# Patient Record
Sex: FEMALE | Race: WHITE | NOT HISPANIC OR LATINO | URBAN - METROPOLITAN AREA
[De-identification: names, ages, dates, MRNs, and addresses within clinical notes are randomized per-mention and may not be internally consistent; named-entity substitution may affect disease eponyms.]

---

## 2021-11-20 ENCOUNTER — HOSPITAL ENCOUNTER (EMERGENCY)
Facility: MEDICAL CENTER | Age: 56
End: 2021-11-20
Payer: MEDICAID

## 2021-11-20 VITALS
BODY MASS INDEX: 31.78 KG/M2 | HEART RATE: 82 BPM | HEIGHT: 59 IN | RESPIRATION RATE: 16 BRPM | OXYGEN SATURATION: 97 % | DIASTOLIC BLOOD PRESSURE: 79 MMHG | TEMPERATURE: 96.8 F | SYSTOLIC BLOOD PRESSURE: 127 MMHG | WEIGHT: 157.63 LBS

## 2021-11-20 LAB — EKG IMPRESSION: NORMAL

## 2021-11-20 PROCEDURE — 302449 STATCHG TRIAGE ONLY (STATISTIC)

## 2021-11-20 PROCEDURE — 93005 ELECTROCARDIOGRAM TRACING: CPT

## 2021-11-20 NOTE — ED TRIAGE NOTES
"Chief Complaint   Patient presents with   • Shortness of Breath     states that she cannot take a deep breath       Patient states that she has been unable to take a deep breath for approx. A week.    Patient denies any COVID exposure and patient has been vaccinated for COVID as well as the Flu for this year.    Pt is alert and oriented, speaking in full sentences, follows commands and responds appropriately to questions. Resp are even and unlabored.      Pt placed in lobby. Pt educated on triage process. Pt encouraged to alert staff for any changes.     Patient and staff wearing appropriate PPE.    Protocol ordered and EKG completed in triage    /79   Pulse 82   Temp 36 °C (96.8 °F) (Temporal)   Resp 16   Ht 1.499 m (4' 11\")   Wt 71.5 kg (157 lb 10.1 oz)   SpO2 97%         "

## 2023-12-28 ENCOUNTER — OFFICE VISIT (OUTPATIENT)
Dept: RHEUMATOLOGY | Facility: MEDICAL CENTER | Age: 58
End: 2023-12-28
Attending: INTERNAL MEDICINE
Payer: MEDICAID

## 2023-12-28 VITALS
SYSTOLIC BLOOD PRESSURE: 130 MMHG | WEIGHT: 130.2 LBS | BODY MASS INDEX: 26.25 KG/M2 | HEART RATE: 107 BPM | OXYGEN SATURATION: 96 % | TEMPERATURE: 97.1 F | HEIGHT: 59 IN | DIASTOLIC BLOOD PRESSURE: 92 MMHG

## 2023-12-28 DIAGNOSIS — Z79.899 LONG-TERM USE OF PLAQUENIL: ICD-10-CM

## 2023-12-28 DIAGNOSIS — K75.4 AUTOIMMUNE HEPATITIS (HCC): ICD-10-CM

## 2023-12-28 DIAGNOSIS — M19.90 INFLAMMATORY ARTHRITIS: ICD-10-CM

## 2023-12-28 DIAGNOSIS — M25.50 POLYARTHRALGIA: ICD-10-CM

## 2023-12-28 DIAGNOSIS — Z79.1 NSAID LONG-TERM USE: ICD-10-CM

## 2023-12-28 PROCEDURE — 99212 OFFICE O/P EST SF 10 MIN: CPT | Performed by: INTERNAL MEDICINE

## 2023-12-28 PROCEDURE — 99205 OFFICE O/P NEW HI 60 MIN: CPT | Performed by: INTERNAL MEDICINE

## 2023-12-28 PROCEDURE — 3080F DIAST BP >= 90 MM HG: CPT | Performed by: INTERNAL MEDICINE

## 2023-12-28 PROCEDURE — 3075F SYST BP GE 130 - 139MM HG: CPT | Performed by: INTERNAL MEDICINE

## 2023-12-28 RX ORDER — LEVOTHYROXINE SODIUM 88 UG/1
TABLET ORAL
COMMUNITY

## 2023-12-28 RX ORDER — BUDESONIDE 3 MG/1
CAPSULE, COATED PELLETS ORAL
COMMUNITY

## 2023-12-28 RX ORDER — OMEPRAZOLE 20 MG/1
CAPSULE, DELAYED RELEASE ORAL
COMMUNITY

## 2023-12-28 RX ORDER — ATORVASTATIN CALCIUM 10 MG/1
10 TABLET, FILM COATED ORAL DAILY
COMMUNITY

## 2023-12-28 RX ORDER — AZATHIOPRINE 50 MG/1
TABLET ORAL
COMMUNITY

## 2023-12-28 RX ORDER — CELECOXIB 200 MG/1
CAPSULE ORAL
COMMUNITY
Start: 2023-11-06 | End: 2023-12-28

## 2023-12-28 RX ORDER — HYDROXYCHLOROQUINE SULFATE 200 MG/1
TABLET, FILM COATED ORAL
Qty: 135 TABLET | Refills: 1 | Status: SHIPPED | OUTPATIENT
Start: 2023-12-28

## 2023-12-28 RX ORDER — CELECOXIB 200 MG/1
CAPSULE ORAL
Qty: 180 CAPSULE | Refills: 1 | Status: SHIPPED | OUTPATIENT
Start: 2023-12-28

## 2023-12-28 ASSESSMENT — JOINT PAIN
TOTAL NUMBER OF TENDER JOINTS: 5
TOTAL NUMBER OF SWOLLEN JOINTS: 0

## 2023-12-28 NOTE — PROGRESS NOTES
Chief Complaint-joint pain    Chief Complaint   Patient presents with    New Patient     Rheumatoid arthritis, unspecified     Salina Salcedo is a 58 y.o. female here as a new Rheumatology Consult referred by KOFI Bass.    This is a new patient evaluation      HPI:   This is a new patient evaluation, patient referred for evaluation of inflammatory arthritis with erosions seen on hand x-rays.  Patient states that she has been having symptoms since about 2021 with pain in her hands, knees elbows, currently on Voltaren with some benefit.  Patient denies any family history of inflammatory arthritis.  Patient denies any history of psoriasis or any chronic skin conditions.    Getting factors is that patient does carry a diagnosis of autoimmune hepatitis and is currently on azathioprine for such through GI.    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      PMHx  HTN  DM  GERD  Hypothyroid  Autoimmune hepatitis  Retinal Pigmentosa bilateral eyes resulting in blindness    Fam Hx  M-passed DM  F-passed prostate cancer  Sx3 one passed liver cirrhosis, retinal pigmentosa  Bx3 liver cirrhosis, leukemia, IVDA, ETOH use    Soc Hx  No tobacco  ETOH every 2 months  No blood tx  No IVDA  No MJ use  No tattoos    Addendum 1/9/2024  G6PD 264 adequate 1/2024 [facility]  RF >650 1/2024 [facility]  Antismooth muscle ab 41 1/2024 [facility]  AMA neg 1/2024 [facility]  CCP>250 1/2024 [facility]    KEMAR neg 7/2023 [facility]      Hand x-rays-8/2023    Left Hand x-rays 8/2023          Current medicines (including changes today)  Current Outpatient Medications   Medication Sig Dispense Refill    atorvastatin (LIPITOR) 10 MG Tab Take 10 mg by mouth every day.      azaTHIOprine (IMURAN) 50 MG Tab 1 tablet, 1 time per day Oral 1 DAY for 0      budesonide (ENTOCORT EC) 3 MG Cap DR Particles capsule 3 capsules, 1 time per day Oral 1 DAY for 0 DYS      levothyroxine (SYNTHROID) 88 MCG Tab TAKE 1 TABLET BY MOUTH  "EVERY DAY IN THE MORNING ON AN EMPTY STOMACH Oral Once a day for 90 days      metformin (GLUCOPHAGE) 1000 MG tablet 1 tablet with a meal Oral twice a day for Diabetes for 90 days      omeprazole (PRILOSEC) 20 MG delayed-release capsule TAKE 1 CAPSULE BY MOUTH ONCE DAILY IN THE MORNING ON AN EMPTY STOMACH      hydroxychloroquine (PLAQUENIL) 200 MG Tab 11/2 tabs po qday 135 Tablet 1    celecoxib (CELEBREX) 200 MG Cap 1 tab po bid with food prn joint pain 180 Capsule 1    cephALEXin (KEFLEX) 500 MG Cap Take 1 Cap by mouth 3 times a day. 21 Cap 0    phenazopyridine (PYRIDIUM) 200 MG Tab Take 1 Tab by mouth 3 times a day as needed for up to 9 doses. 6 Tab 0     No current facility-administered medications for this visit.     She  has no past medical history on file.  She  has no past surgical history on file.  History reviewed. No pertinent family history.  No family status information on file.     Social History     Tobacco Use    Smoking status: Never    Smokeless tobacco: Never   Vaping Use    Vaping Use: Never used   Substance Use Topics    Alcohol use: Yes     Comment: occ    Drug use: Never     Social History     Social History Narrative    Not on file       ROS   Other than what is mentioned in HPI or physical exam, there is no history of headaches, double vision or blurred vision. No temporal tenderness or jaw claudication.  No trouble swallowing difficulties or sore throats.  No chest complaints including chest pain, cough or sputum production. No GI complaints including nausea, vomiting, change in bowel habits, or past peptic ulcer disease. No history of blood in the stools. No urinary complaints including dysuria or frequency. No history of alopecia, photosensitivity, oral ulcerations, Raynaud's phenomena.       Objective:     BP (!) 130/92 (BP Location: Left arm, Patient Position: Sitting, BP Cuff Size: Adult)   Pulse (!) 107   Temp 36.2 °C (97.1 °F) (Tympanic)   Ht 1.499 m (4' 11\")   Wt 59.1 kg (130 lb " 3.2 oz)   SpO2 96%  Body mass index is 26.3 kg/m².  Physical Exam:    Constitutional: Alert and oriented X3, no distress.Skin: Warm, dry, good turgor, no rashes in visible areas, no psoriatic lesions seen.  Eye: Equal, round and reactive, conjunctiva clear, lids normal EOM intactENMT: Lips without lesions, good dentition, no oropharyngeal ulcers, moist buccal mucosa, pinna without deformityNeck: Trachea midline, no masses, no thyromegaly.Lymph:  No cervical lymphadenopathy, no axillary lymphadenopathy, no supraclavicular lymphadenopathyRespiratory: Unlabored respiratory effort, lungs clear to auscultation, no wheezes, no ronchi.Cardiovascular: Normal S1, S2, Regular rate and rhythm, no murmurs rubs or gallops  Abdomen: Soft, non-tender, no masses, no hepatosplenomegaly.Psych: Alert and oriented x3, normal affect and mood.Neuro Cranial nerves 2-12 are grossly intact, no loss of sensation LEExt:no joint laxity noted in bilateral arms, no joint laxity noted in bilateral legs, patient with evidence of swan-neck deformities most fingers of right hand, shoulders good range of motion but limited abduction to about 90 degrees, elbows without flexion contractures no nodules no tophi knees with crepitus but no synovitis, toes without crossover toes and without splay toes    Lab Results   Component Value Date/Time    HEPBCORIGM Negative 02/02/2007 02:15 PM    HEPBSAG Negative 02/02/2007 02:15 PM     Lab Results   Component Value Date/Time    HEPCAB 0.3 02/02/2007 02:15 PM     Lab Results   Component Value Date/Time    SODIUM 130 (L) 01/31/2007 12:30 PM    POTASSIUM 3.2 (L) 01/31/2007 12:30 PM    CHLORIDE 99 01/31/2007 12:30 PM    CO2 24 01/31/2007 12:30 PM    GLUCOSE 130 (H) 01/31/2007 12:30 PM    BUN 8 01/31/2007 12:30 PM    CREATININE 1.2 01/31/2007 12:30 PM      Lab Results   Component Value Date/Time    WBC 5.8 01/31/2007 12:30 PM    RBC 4.07 (L) 01/31/2007 12:30 PM    HEMOGLOBIN 14.0 01/31/2007 12:30 PM    HEMATOCRIT  38.4 01/31/2007 12:30 PM    MCV 94.2 01/31/2007 12:30 PM    MCH 34.4 (H) 01/31/2007 12:30 PM    MCHC 36.5 (H) 01/31/2007 12:30 PM    MPV 8.2 01/31/2007 12:30 PM    NEUTSPOLYS 77.2 (H) 01/31/2007 12:30 PM    LYMPHOCYTES 17.7 (L) 01/31/2007 12:30 PM    MONOCYTES 3.4 01/31/2007 12:30 PM    EOSINOPHILS 0.7 01/31/2007 12:30 PM    BASOPHILS 1.0 01/31/2007 12:30 PM      Lab Results   Component Value Date/Time    CALCIUM 8.7 01/31/2007 12:30 PM    ASTSGOT 701 (H) 01/31/2007 12:30 PM    ALTSGPT 486 (H) 01/31/2007 12:30 PM    ALKPHOSPHAT 60 01/31/2007 12:30 PM    TBILIRUBIN 0.9 01/31/2007 12:30 PM    ALBUMIN 4.0 01/31/2007 12:30 PM    TOTPROTEIN 8.6 (H) 01/31/2007 12:30 PM     Lab Results   Component Value Date/Time    COLORURINE Yellow 12/02/2016 09:15 PM    SPECGRAVITY 1.016 12/02/2016 09:15 PM    PHURINE 5.5 12/02/2016 09:15 PM    GLUCOSEUR Negative 12/02/2016 09:15 PM    KETONES Negative 12/02/2016 09:15 PM    PROTEINURIN 30 (A) 12/02/2016 09:15 PM       Assessment and Plan:  1. Inflammatory arthritis  As determined by erosions seen on hand x-rays, today check rheumatoid factor and CCP, does not seem to be a history of psoriasis, start a trial of Plaquenil 300 mg p.o. daily with dose based on patient's weight, also switch Voltaren to Celebrex to avoid liver toxicity in light of this patient's history of autoimmune hepatitis.  - hydroxychloroquine (PLAQUENIL) 200 MG Tab; 11/2 tabs po qday  Dispense: 135 Tablet; Refill: 1  - CCP ANTIBODIES, IGG/IGA; Future  - RHEUMATOID ARTHRITIS FACTOR; Future  - ANTI-SMOOTH MUSCLE ABS; Future  - MITOCHONDRIAL (M2) AB; Future  - G6PD QUANT + RBC; Future  - Comp Metabolic Panel; Future  - CBC WITH DIFFERENTIAL; Future  - Sed Rate; Future  - celecoxib (CELEBREX) 200 MG Cap; 1 tab po bid with food prn joint pain  Dispense: 180 Capsule; Refill: 1      2. Long-term use of Plaquenil  Start Plaquenil 300 mg p.o. daily with dose based on patient's weight, today check G6PD levels, if we continue  with this particular medication we will have patient get eye exam every year although patient already sees neurology for retinal pigmentosa  - hydroxychloroquine (PLAQUENIL) 200 MG Tab; 11/2 tabs po qday  Dispense: 135 Tablet; Refill: 1  - CCP ANTIBODIES, IGG/IGA; Future  - RHEUMATOID ARTHRITIS FACTOR; Future  - ANTI-SMOOTH MUSCLE ABS; Future  - MITOCHONDRIAL (M2) AB; Future  - G6PD QUANT + RBC; Future  - Comp Metabolic Panel; Future  - CBC WITH DIFFERENTIAL; Future  - Sed Rate; Future  - celecoxib (CELEBREX) 200 MG Cap; 1 tab po bid with food prn joint pain  Dispense: 180 Capsule; Refill: 1    3. Autoimmune hepatitis (HCC)  Currently on azathioprine for autoimmune hepatitis prescribed by other physician, today check serologies  - CCP ANTIBODIES, IGG/IGA; Future  - RHEUMATOID ARTHRITIS FACTOR; Future  - ANTI-SMOOTH MUSCLE ABS; Future  - MITOCHONDRIAL (M2) AB; Future  - G6PD QUANT + RBC; Future  - Comp Metabolic Panel; Future  - CBC WITH DIFFERENTIAL; Future  - Sed Rate; Future    4. NSAID long-term use  Which fullterm to Celebrex 200 mg p.o. twice daily to avoid liver toxicity in light of this patient's past medical history of autoimmune hepatitis  - celecoxib (CELEBREX) 200 MG Cap; 1 tab po bid with food prn joint pain  Dispense: 180 Capsule; Refill: 1    Records requested.  Followup: Return in about 3 months (around 3/28/2024). or sooner prn  Patient was seen 60 minutes face-to-face (excluding time for procedures)  of which more than 50% the time was spent counseling the patient regarding  rheumatological conditions and care. Therapy was discussed in detail.  Thank you for this referral.    Please note that this dictation was created using voice recognition software. I have made every reasonable attempt to correct obvious errors, but I expect that there are errors of grammar and possibly content that I did not discover before finalizing the note.

## 2023-12-28 NOTE — ASSESSMENT & PLAN NOTE
Patient here for eval of RA, patient staes that started having pain in hands about 2021, currently on voltaren    PMHx  HTN  DM  GERD  Hypothyroid  Autoimmune hepatitis  Retinal Pigmentosa bilateral eyes resulting in blindness    Fam Hx  M-passed DM  F-passed prostate cancer  Sx3 one passed liver cirrhosis, retinal pigmentosa  Bx3 liver cirrhosis, leukemia, IVDA, ETOH use    Soc Hx  No tobacco  ETOH every 2 months  No blood tx  No IVDA  No MJ use  No tattoos    KEMAR neg 7/2023 [facility]      Hand x-rays-8/2023    Left Hand x-rays 8/2023

## 2024-03-25 ENCOUNTER — TELEPHONE (OUTPATIENT)
Dept: RHEUMATOLOGY | Facility: MEDICAL CENTER | Age: 59
End: 2024-03-25

## 2024-03-25 DIAGNOSIS — E72.59: ICD-10-CM

## 2024-04-02 ENCOUNTER — OFFICE VISIT (OUTPATIENT)
Dept: RHEUMATOLOGY | Facility: MEDICAL CENTER | Age: 59
End: 2024-04-02
Attending: INTERNAL MEDICINE
Payer: MEDICAID

## 2024-04-02 VITALS
RESPIRATION RATE: 14 BRPM | WEIGHT: 131 LBS | SYSTOLIC BLOOD PRESSURE: 130 MMHG | TEMPERATURE: 97.1 F | DIASTOLIC BLOOD PRESSURE: 78 MMHG | HEART RATE: 79 BPM | OXYGEN SATURATION: 94 % | BODY MASS INDEX: 26.46 KG/M2

## 2024-04-02 DIAGNOSIS — M05.79 RHEUMATOID ARTHRITIS INVOLVING MULTIPLE SITES WITH POSITIVE RHEUMATOID FACTOR (HCC): ICD-10-CM

## 2024-04-02 DIAGNOSIS — K75.4 AUTOIMMUNE HEPATITIS (HCC): ICD-10-CM

## 2024-04-02 DIAGNOSIS — Z79.1 NSAID LONG-TERM USE: ICD-10-CM

## 2024-04-02 DIAGNOSIS — Z79.899 LONG-TERM USE OF PLAQUENIL: ICD-10-CM

## 2024-04-02 DIAGNOSIS — M19.90 INFLAMMATORY ARTHRITIS: ICD-10-CM

## 2024-04-02 PROCEDURE — 99212 OFFICE O/P EST SF 10 MIN: CPT

## 2024-04-02 PROCEDURE — 3075F SYST BP GE 130 - 139MM HG: CPT | Performed by: INTERNAL MEDICINE

## 2024-04-02 PROCEDURE — 3078F DIAST BP <80 MM HG: CPT | Performed by: INTERNAL MEDICINE

## 2024-04-02 PROCEDURE — 99214 OFFICE O/P EST MOD 30 MIN: CPT | Performed by: INTERNAL MEDICINE

## 2024-04-02 RX ORDER — HYDROXYCHLOROQUINE SULFATE 200 MG/1
TABLET, FILM COATED ORAL
Qty: 135 TABLET | Refills: 1 | Status: SHIPPED | OUTPATIENT
Start: 2024-04-02

## 2024-04-02 ASSESSMENT — PATIENT HEALTH QUESTIONNAIRE - PHQ9: CLINICAL INTERPRETATION OF PHQ2 SCORE: 0

## 2024-04-02 NOTE — PROGRESS NOTES
Chief Complaint- joint pain     Subjective:   Salina Salcedo is a 58 y.o. female here today for follow up of rheumatological issues    This is a follow-up visit for this patient, second visit with us for this patient to was referred for evaluation of inflammatory arthritis.  Patient x-rays did show erosions on hand x-rays, we proceeded to do additional laboratory studies and patient was found to have a positive rheumatoid factor as well as a positive anti-CCP.  At last visit we started patient on Plaquenil 300 mg p.o. daily with dose based on patient's weight.  Patient's been on Plaquenil 3 mg p.o. daily since December 2023, patient comes in today for follow-up for evaluation.  Patient states that she is doing much much better, denies any side effects from the Plaquenil, and states that she would like to continue with this particular regiment.    Additional comorbidities include autoimmune hepatitis with positive serologies currently treated with azathioprine by GI.     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        PMHx  HTN  DM  GERD  Hypothyroid  Autoimmune hepatitis  Retinal Pigmentosa bilateral eyes resulting in blindness     Fam Hx  M-passed DM  F-passed prostate cancer  Sx3 one passed liver cirrhosis, retinal pigmentosa  Bx3 liver cirrhosis, leukemia, IVDA, ETOH use     Soc Hx  No tobacco  ETOH every 2 months  No blood tx  No IVDA  No MJ use  No tattoos     G6PD 264 adequate 1/2024 [facility]  RF >650 1/2024 [facility]  Antismooth muscle ab 41 1/2024 [facility]  AMA neg 1/2024 [facility]  CCP>250 1/2024 [facility]  KEMAR neg 7/2023 [facility]        Hand x-rays-8/2023    Left Hand x-rays 8/2023             Current Outpatient Medications   Medication Sig Dispense Refill    hydroxychloroquine (PLAQUENIL) 200 MG Tab 11/2 tabs po qday 135 Tablet 1    azaTHIOprine (IMURAN) 50 MG Tab 1 tablet, 1 time per day Oral 1 DAY for 0      levothyroxine (SYNTHROID) 88 MCG Tab TAKE 1 TABLET BY MOUTH EVERY  DAY IN THE MORNING ON AN EMPTY STOMACH Oral Once a day for 90 days      metformin (GLUCOPHAGE) 1000 MG tablet 1 tablet with a meal Oral twice a day for Diabetes for 90 days      omeprazole (PRILOSEC) 20 MG delayed-release capsule TAKE 1 CAPSULE BY MOUTH ONCE DAILY IN THE MORNING ON AN EMPTY STOMACH      atorvastatin (LIPITOR) 10 MG Tab Take 10 mg by mouth every day. (Patient not taking: Reported on 4/2/2024)      budesonide (ENTOCORT EC) 3 MG Cap DR Particles capsule 3 capsules, 1 time per day Oral 1 DAY for 0 DYS (Patient not taking: Reported on 4/2/2024)      cephALEXin (KEFLEX) 500 MG Cap Take 1 Cap by mouth 3 times a day. (Patient not taking: Reported on 4/2/2024) 21 Cap 0    phenazopyridine (PYRIDIUM) 200 MG Tab Take 1 Tab by mouth 3 times a day as needed for up to 9 doses. (Patient not taking: Reported on 4/2/2024) 6 Tab 0     No current facility-administered medications for this visit.     She  has no past medical history on file.    ROS   Other than what is mentioned in HPI or physical exam, there is no history of headaches, double vision or blurred vision.  No trouble swallowing difficulties .  No chest complaints including chest pain, cough or sputum production. No GI complaints including nausea, vomiting, change in bowel habits, or past peptic ulcer disease. No history of blood in the stools. No urinary complaints including dysuria or frequency. No history of alopecia, photosensitivity     Objective:     /78   Pulse 79   Temp 36.2 °C (97.1 °F) (Temporal)   Resp 14   Wt 59.4 kg (131 lb)   SpO2 94%  Body mass index is 26.46 kg/m².   Physical Exam:    Constitutional: Alert and oriented X3, patient is talkative with good eye contact.Skin: Warm, dry, good turgor, no rashes in visible areas.Eye: Equal, round and reactive, conjunctiva clear, lids normal EOM intactENMT: Lips without lesions,  pinna without deformityNeck: Trachea midline, no masses, no thyromegaly.Lymph:  No cervical lymphadenopathy, no  axillary lymphadenopathy, no supraclavicular lymphadenopathyRespiratory: Unlabored respiratory effort, lungs clear to auscultation, no wheezes, no ronchi.Cardiovascular: Normal S1, S2, Regular rate and rhythm, no murmurs rubs or gallops  .Abdomen: Soft, non-distended.Psych: Alert and oriented x3, normal affect and mood.Neuro: Cranial nerves 2-12 are grossly intact Ext:no joint laxity noted in bilateral arms, no joint laxity noted in bilateral legs, joints look great, no swan-neck or boutonniere deformities, no sausage digits, no dactylitis, toes without crossover toes and without splay toes, mild pes planus bilateral feet, shoulders full range of motion without limitations    Lab Results   Component Value Date/Time    HEPBCORIGM Negative 02/02/2007 02:15 PM    HEPBSAG Negative 02/02/2007 02:15 PM     Lab Results   Component Value Date/Time    HEPCAB 0.3 02/02/2007 02:15 PM     Lab Results   Component Value Date/Time    SODIUM 130 (L) 01/31/2007 12:30 PM    POTASSIUM 3.2 (L) 01/31/2007 12:30 PM    CHLORIDE 99 01/31/2007 12:30 PM    CO2 24 01/31/2007 12:30 PM    GLUCOSE 130 (H) 01/31/2007 12:30 PM    BUN 8 01/31/2007 12:30 PM    CREATININE 1.2 01/31/2007 12:30 PM      Lab Results   Component Value Date/Time    WBC 5.8 01/31/2007 12:30 PM    RBC 4.07 (L) 01/31/2007 12:30 PM    HEMOGLOBIN 14.0 01/31/2007 12:30 PM    HEMATOCRIT 38.4 01/31/2007 12:30 PM    MCV 94.2 01/31/2007 12:30 PM    MCH 34.4 (H) 01/31/2007 12:30 PM    MCHC 36.5 (H) 01/31/2007 12:30 PM    MPV 8.2 01/31/2007 12:30 PM    NEUTSPOLYS 77.2 (H) 01/31/2007 12:30 PM    LYMPHOCYTES 17.7 (L) 01/31/2007 12:30 PM    MONOCYTES 3.4 01/31/2007 12:30 PM    EOSINOPHILS 0.7 01/31/2007 12:30 PM    BASOPHILS 1.0 01/31/2007 12:30 PM      Lab Results   Component Value Date/Time    CALCIUM 8.7 01/31/2007 12:30 PM    ASTSGOT 701 (H) 01/31/2007 12:30 PM    ALTSGPT 486 (H) 01/31/2007 12:30 PM    ALKPHOSPHAT 60 01/31/2007 12:30 PM    TBILIRUBIN 0.9 01/31/2007 12:30 PM     ALBUMIN 4.0 01/31/2007 12:30 PM    TOTPROTEIN 8.6 (H) 01/31/2007 12:30 PM     Assessment and Plan:     1. Rheumatoid arthritis involving multiple sites with positive rheumatoid factor (HCC)  Clinically stable, continue Plaquenil 300 mg p.o. daily with dose based on patient's weight  - hydroxychloroquine (PLAQUENIL) 200 MG Tab; 11/2 tabs po qday  Dispense: 135 Tablet; Refill: 1  - Comp Metabolic Panel; Future  - CBC WITH DIFFERENTIAL; Future  - Sed Rate; Future    2. Long-term use of Plaquenil  Currently on Plaquenil 3 mg p.o. daily with dose based on patient's weight of note G6PD levels are adequate, patient does need monitoring labs about every 6 months, next labs due about July 2024, labs ordered for patient  As patient is completely blind status post affliction with retinal pigmentosa bilateral eyes, patient already followed by ophthalmology.  - hydroxychloroquine (PLAQUENIL) 200 MG Tab; 11/2 tabs po qday  Dispense: 135 Tablet; Refill: 1  - Comp Metabolic Panel; Future  - CBC WITH DIFFERENTIAL; Future  - Sed Rate; Future    3. Autoimmune hepatitis (HCC)  Currently on azathioprine treated by GI  - hydroxychloroquine (PLAQUENIL) 200 MG Tab; 11/2 tabs po qday  Dispense: 135 Tablet; Refill: 1  - Comp Metabolic Panel; Future  - CBC WITH DIFFERENTIAL; Future  - Sed Rate; Future    4. NSAID long-term use  Patient uses NSAIDs as needed, patient needs monitoring labs every 6 months, next labs due July 2024, labs ordered for patient      Followup: Return in about 6 months (around 10/2/2024). or sooner prn      Please note that this dictation was created using voice recognition software. I have made every reasonable attempt to correct obvious errors, but I expect that there are errors of grammar and possibly content that I did not discover before finalizing the note.

## 2024-10-15 ENCOUNTER — OFFICE VISIT (OUTPATIENT)
Dept: RHEUMATOLOGY | Facility: MEDICAL CENTER | Age: 59
End: 2024-10-15
Attending: INTERNAL MEDICINE
Payer: MEDICAID

## 2024-10-15 VITALS
BODY MASS INDEX: 27.67 KG/M2 | SYSTOLIC BLOOD PRESSURE: 110 MMHG | HEART RATE: 95 BPM | TEMPERATURE: 97.2 F | WEIGHT: 137 LBS | RESPIRATION RATE: 16 BRPM | DIASTOLIC BLOOD PRESSURE: 60 MMHG | OXYGEN SATURATION: 95 %

## 2024-10-15 DIAGNOSIS — Z79.1 NSAID LONG-TERM USE: ICD-10-CM

## 2024-10-15 DIAGNOSIS — Z79.899 LONG-TERM USE OF PLAQUENIL: ICD-10-CM

## 2024-10-15 DIAGNOSIS — M05.79 RHEUMATOID ARTHRITIS INVOLVING MULTIPLE SITES WITH POSITIVE RHEUMATOID FACTOR (HCC): ICD-10-CM

## 2024-10-15 DIAGNOSIS — K75.4 AUTOIMMUNE HEPATITIS (HCC): ICD-10-CM

## 2024-10-15 PROCEDURE — 99212 OFFICE O/P EST SF 10 MIN: CPT | Performed by: INTERNAL MEDICINE

## 2024-10-15 PROCEDURE — 99214 OFFICE O/P EST MOD 30 MIN: CPT | Performed by: INTERNAL MEDICINE

## 2024-10-15 PROCEDURE — 3074F SYST BP LT 130 MM HG: CPT | Performed by: INTERNAL MEDICINE

## 2024-10-15 PROCEDURE — 3078F DIAST BP <80 MM HG: CPT | Performed by: INTERNAL MEDICINE

## 2024-10-15 RX ORDER — HYDROXYCHLOROQUINE SULFATE 200 MG/1
TABLET, FILM COATED ORAL
Qty: 135 TABLET | Refills: 1 | Status: SHIPPED | OUTPATIENT
Start: 2024-10-15

## 2024-10-15 ASSESSMENT — JOINT PAIN
TOTAL NUMBER OF TENDER JOINTS: 2
TOTAL NUMBER OF SWOLLEN JOINTS: 0

## 2025-04-22 ENCOUNTER — HOSPITAL ENCOUNTER (OUTPATIENT)
Facility: MEDICAL CENTER | Age: 60
End: 2025-04-22
Attending: STUDENT IN AN ORGANIZED HEALTH CARE EDUCATION/TRAINING PROGRAM
Payer: MEDICAID

## 2025-04-22 LAB
25(OH)D3 SERPL-MCNC: <6 NG/ML (ref 30–100)
ALBUMIN SERPL BCP-MCNC: 4 G/DL (ref 3.2–4.9)
ALBUMIN/GLOB SERPL: 0.9 G/DL
ALP SERPL-CCNC: 110 U/L (ref 30–99)
ALT SERPL-CCNC: 16 U/L (ref 2–50)
ANION GAP SERPL CALC-SCNC: 11 MMOL/L (ref 7–16)
AST SERPL-CCNC: 26 U/L (ref 12–45)
BASOPHILS # BLD AUTO: 0.9 % (ref 0–1.8)
BASOPHILS # BLD: 0.06 K/UL (ref 0–0.12)
BILIRUB SERPL-MCNC: 0.4 MG/DL (ref 0.1–1.5)
BUN SERPL-MCNC: 17 MG/DL (ref 8–22)
CALCIUM ALBUM COR SERPL-MCNC: 9 MG/DL (ref 8.5–10.5)
CALCIUM SERPL-MCNC: 9 MG/DL (ref 8.5–10.5)
CHLORIDE SERPL-SCNC: 108 MMOL/L (ref 96–112)
CHOLEST SERPL-MCNC: 160 MG/DL (ref 100–199)
CO2 SERPL-SCNC: 22 MMOL/L (ref 20–33)
CREAT SERPL-MCNC: 0.75 MG/DL (ref 0.5–1.4)
EOSINOPHIL # BLD AUTO: 0.3 K/UL (ref 0–0.51)
EOSINOPHIL NFR BLD: 4.3 % (ref 0–6.9)
ERYTHROCYTE [DISTWIDTH] IN BLOOD BY AUTOMATED COUNT: 46.2 FL (ref 35.9–50)
EST. AVERAGE GLUCOSE BLD GHB EST-MCNC: 143 MG/DL
FASTING STATUS PATIENT QL REPORTED: NORMAL
GFR SERPLBLD CREATININE-BSD FMLA CKD-EPI: 91 ML/MIN/1.73 M 2
GLOBULIN SER CALC-MCNC: 4.3 G/DL (ref 1.9–3.5)
GLUCOSE SERPL-MCNC: 94 MG/DL (ref 65–99)
HBA1C MFR BLD: 6.6 % (ref 4–5.6)
HCT VFR BLD AUTO: 36.8 % (ref 37–47)
HDLC SERPL-MCNC: 44 MG/DL
HGB BLD-MCNC: 11.9 G/DL (ref 12–16)
LDLC SERPL CALC-MCNC: 90 MG/DL
LYMPHOCYTES # BLD AUTO: 1.74 K/UL (ref 1–4.8)
LYMPHOCYTES NFR BLD: 25.2 % (ref 22–41)
MANUAL DIFF BLD: NORMAL
MCH RBC QN AUTO: 29.3 PG (ref 27–33)
MCHC RBC AUTO-ENTMCNC: 32.3 G/DL (ref 32.2–35.5)
MCV RBC AUTO: 90.6 FL (ref 81.4–97.8)
MONOCYTES # BLD AUTO: 0.42 K/UL (ref 0–0.85)
MONOCYTES NFR BLD AUTO: 6.1 % (ref 0–13.4)
NEUTROPHILS # BLD AUTO: 4.38 K/UL (ref 1.82–7.42)
NEUTROPHILS NFR BLD: 63.5 % (ref 44–72)
NRBC # BLD AUTO: 0 K/UL
NRBC BLD-RTO: 0 /100 WBC (ref 0–0.2)
PLATELET # BLD AUTO: 304 K/UL (ref 164–446)
PLATELET BLD QL SMEAR: NORMAL
PMV BLD AUTO: 10.4 FL (ref 9–12.9)
POTASSIUM SERPL-SCNC: 4.1 MMOL/L (ref 3.6–5.5)
PROT SERPL-MCNC: 8.3 G/DL (ref 6–8.2)
RBC # BLD AUTO: 4.06 M/UL (ref 4.2–5.4)
RBC BLD AUTO: NORMAL
SODIUM SERPL-SCNC: 141 MMOL/L (ref 135–145)
TRIGL SERPL-MCNC: 130 MG/DL (ref 0–149)
TSH SERPL DL<=0.005 MIU/L-ACNC: 0.73 UIU/ML (ref 0.38–5.33)
WBC # BLD AUTO: 6.9 K/UL (ref 4.8–10.8)

## 2025-04-22 PROCEDURE — 80061 LIPID PANEL: CPT

## 2025-04-22 PROCEDURE — 85027 COMPLETE CBC AUTOMATED: CPT

## 2025-04-22 PROCEDURE — 85007 BL SMEAR W/DIFF WBC COUNT: CPT

## 2025-04-22 PROCEDURE — 36415 COLL VENOUS BLD VENIPUNCTURE: CPT

## 2025-04-22 PROCEDURE — 82306 VITAMIN D 25 HYDROXY: CPT

## 2025-04-22 PROCEDURE — 84443 ASSAY THYROID STIM HORMONE: CPT

## 2025-04-22 PROCEDURE — 83036 HEMOGLOBIN GLYCOSYLATED A1C: CPT

## 2025-04-22 PROCEDURE — 80053 COMPREHEN METABOLIC PANEL: CPT

## 2025-04-28 DIAGNOSIS — Z79.899 LONG-TERM USE OF PLAQUENIL: ICD-10-CM

## 2025-04-28 DIAGNOSIS — K75.4 AUTOIMMUNE HEPATITIS (HCC): ICD-10-CM

## 2025-04-28 DIAGNOSIS — M05.79 RHEUMATOID ARTHRITIS INVOLVING MULTIPLE SITES WITH POSITIVE RHEUMATOID FACTOR (HCC): ICD-10-CM

## 2025-04-28 RX ORDER — HYDROXYCHLOROQUINE SULFATE 200 MG/1
TABLET, FILM COATED ORAL
Qty: 135 TABLET | Refills: 1 | Status: SHIPPED | OUTPATIENT
Start: 2025-04-28

## 2025-04-28 NOTE — TELEPHONE ENCOUNTER
Received request via: Pharmacy    Was the patient seen in the last year in this department? Yes    Does the patient have an active prescription (recently filled or refills available) for medication(s) requested? No    Pharmacy Name: [facility]    Does the patient have skilled nursing Plus and need 100-day supply? (This applies to ALL medications) Patient does not have SCP

## 2025-04-30 ENCOUNTER — OFFICE VISIT (OUTPATIENT)
Dept: RHEUMATOLOGY | Facility: MEDICAL CENTER | Age: 60
End: 2025-04-30
Attending: STUDENT IN AN ORGANIZED HEALTH CARE EDUCATION/TRAINING PROGRAM
Payer: MEDICAID

## 2025-04-30 VITALS
RESPIRATION RATE: 16 BRPM | DIASTOLIC BLOOD PRESSURE: 70 MMHG | OXYGEN SATURATION: 93 % | HEART RATE: 89 BPM | TEMPERATURE: 97.6 F | WEIGHT: 147 LBS | BODY MASS INDEX: 29.64 KG/M2 | SYSTOLIC BLOOD PRESSURE: 110 MMHG | HEIGHT: 59 IN

## 2025-04-30 DIAGNOSIS — K75.4 AUTOIMMUNE HEPATITIS (HCC): ICD-10-CM

## 2025-04-30 DIAGNOSIS — Z79.1 NSAID LONG-TERM USE: ICD-10-CM

## 2025-04-30 DIAGNOSIS — E55.9 VITAMIN D DEFICIENCY: ICD-10-CM

## 2025-04-30 DIAGNOSIS — M05.9 SEROPOSITIVE RHEUMATOID ARTHRITIS (HCC): Primary | ICD-10-CM

## 2025-04-30 DIAGNOSIS — D84.9 IMMUNOSUPPRESSED STATUS (HCC): ICD-10-CM

## 2025-04-30 DIAGNOSIS — Z79.899 LONG-TERM USE OF PLAQUENIL: ICD-10-CM

## 2025-04-30 PROCEDURE — 99214 OFFICE O/P EST MOD 30 MIN: CPT | Performed by: STUDENT IN AN ORGANIZED HEALTH CARE EDUCATION/TRAINING PROGRAM

## 2025-04-30 RX ORDER — LEFLUNOMIDE 20 MG/1
20 TABLET ORAL DAILY
Qty: 90 TABLET | Refills: 3 | Status: SHIPPED | OUTPATIENT
Start: 2025-04-30

## 2025-04-30 RX ORDER — ERGOCALCIFEROL 1.25 MG/1
50000 CAPSULE ORAL
Qty: 12 CAPSULE | Refills: 1 | Status: SHIPPED | OUTPATIENT
Start: 2025-04-30

## 2025-04-30 ASSESSMENT — PATIENT HEALTH QUESTIONNAIRE - PHQ9: CLINICAL INTERPRETATION OF PHQ2 SCORE: 0

## 2025-04-30 ASSESSMENT — FIBROSIS 4 INDEX: FIB4 SCORE: 1.28

## 2025-04-30 NOTE — PATIENT INSTRUCTIONS
Thank you for visiting our clinic today.     Summary of your visit:    Due to active rheumatoid arthritis noticed on exam today, we need to control it better so I have added leflunomide which you will take once a day.     Continue hydroxychloroquine 300 mg daily (1.5 tablets).     Non-fasting blood work in 3 months.     Start ergocalciferol once a week for low vitamin D.     Follow up in 4 months.     Leflunomide (Arava) is a drug approved to treat adult moderate to severe rheumatoid arthritis along with other rheumatic diseases. It belongs to a class of medications called disease-modifying antirheumatic drugs (DMARDs), which aim to decrease inflammation and permanent damage.  Leflunomide blocks the formation of DNA, which is important for replicating cells, such as those in the immune system. It suppresses the immune system to reduce inflammation that causes pain and swelling.    How to Take It  Leflunomide usually is given as a 20 mg tablet once a day. Sometimes, patients are given 10 mg, especially if they experienced side effects with the higher dose. Leflunomide should be taken with food. Complete benefits may not be experienced until 6-12 weeks after starting the medication.  It is important that you have regular blood tests, including those for liver function, while taking this medication. You should not take leflunomide if you have a pre-existing liver disease, such as hepatitis or cirrhosis. Because alcohol may increase the risk of liver damage from leflunomide, alcohol should be avoided.    Side Effects  The most common side effect of leflunomide is diarrhea, which occurs in approximately 20% of patients. This symptom frequently improves with time or by taking a medication to prevent diarrhea. If diarrhea persists, the dose of leflunomide may need to be reduced.  Less common side effects include nausea, stomach pain, indigestion, rash, and hair loss. In fewer than 10% of patients, leflunomide can cause  abnormal liver function tests or decreased blood cell or platelet counts. Rarely, this drug may cause lung problems, such as cough, shortness of breath or lung injury.    Tell Your Rheumatology Provider  Leflunomide can cause serious birth defects. If you are pregnant or are considering having a child, you should discuss this with your rheumatology provider before beginning the medication. Breastfeeding while taking leflunomide is not recommended. Use of an effective form of birth control is critical throughout the course of this treatment. Men taking leflunomide who wish to have a child also should talk with their rheumatology provider about how to discontinue the medication. Cholestyramine is a medication you can take to help remove leflunomide from your system.  Live vaccinations should be avoided while taking this medication. Be sure to discuss any vaccines with your rheumatology provider before receiving them. It may be important to receive certain vaccines before starting this medication, such as the Pneumovax (pneumonia vaccine), hepatitis B, or tetanus booster for some patients.  Because this medication can lower your immunity, it is important you inform your rheumatologist if you have any infections or are planning to undergo any surgeries.       [facility] AFTER VISIT GUIDE  Below are important guidelines to help you navigate your health care needs and assist us in caring for you safely and  effectively. We encourage you to carefully read and understand this information and adhere to them accordingly.    [facility] Messaging and Phone Calls:   Diagnosis and Treatment - For a detailed explanation of your condition and treatment plan from today’s visit, refer  to the visit note on [facility] via the following steps:  o Log in to [facility] and click on “Visits” at the top.  o Scroll down to “Past Visits” under Appointments.  o Click on “View Notes” under the appropriate visit date.   Questions or Concerns -  MyChart messaging is for non-urgent matters that do not require immediate attention and  should be brief with no more than two questions or concerns. If you have multiple questions or concerns, we ask that  you schedule an appointment to have them properly addressed.   Response to Messages - [facility] [employer] [facility] messages are addressed throughout the week depending on clinical availability,  so we ask that you allow up to one week for a response.   Phone Calls and Voicemails - Phone calls and voicemail messages are reserved for time-sensitive matters that  cannot wait to be addressed via [facility]. We ask that you refrain from calling the office multiple times or leaving  multiple voicemails regarding the same issue as doing so may lead to delays in response time.   Urgent Issues - For urgent medical matters or medical emergencies that cannot wait, you are advised to go to your  nearest Urgent Care or Emergency Department for immediate attention.    Laboratory Tests and Imaging Studies:   Future Lab and Imaging Orders - We ask that you get your lab tests and imaging studies done no later than one  week before your follow-up visit unless instructed otherwise.   Results Communication - You may see some test results marked as “abnormal” that are not necessarily significant  or concerning. If there are significant abnormalities on your test results that warrant further action, you will be notified  via [email or web address] or phone call, otherwise they will be addressed at your follow-up visit.    Prescriptions and Refill Requests:   General Prescriptions (e.g. prednisone, hydroxychloroquine, leflunomide, methotrexate, etc.) - These are sent  to Retail Pharmacies, so all refill requests of these medications should be directed to your local pharmacy.   Specialty Prescriptions (e.g. Enbrel, Humira, Cosentyx, Xeljanz, etc.) - These are sent to Specialty Pharmacies,  so all refill requests of these medications should be directed to your designated  specialty pharmacy.   Infusion Prescriptions (e.g. Remicade, Simponi Aria, Rituxan, Saphnelo, etc.) - These are sent to Outpatient  Infusion Centers, so all scheduling requests of these medications should be directed to your local infusion center.    Medication Risks and Adverse Effects:   Immunosuppressed Status - Steroids and antirheumatic drugs are immunosuppressants, so they increase the risk  of infections and can have side effects on various organ systems in your body, though most of them are uncommon.   Potential Side Effects - Be sure to read the drug package inserts to learn about the potential side effects of your  medications before you start taking them and take them exactly as prescribed unless instructed otherwise.   In Case of Side Effects - If you experience any significant side effects, stop taking the medication immediately and  promptly notify the prescriber. Depending on the severity of the side effects, consider going to an Urgent Care or  Emergency Department for immediate attention.    Immunizations and Health Screening:   Vaccinations - If you are on immunosuppressive therapy, it is important that you are up to date on age-appropriate  immunizations, particularly shingles and pneumonia vaccines, which you can request from your primary care provider  or from us at your next appointment.   Screening Tests - It is also important that you are up to date on age-appropriate screening tests, such as pap  smear, mammography, and colonoscopy, which you can request from your primary care provider.    Educational and Supportive Resources:   [facility] ([email or web address]) - Visit our website to learn more about  your condition and other rheumatic diseases, and gain access to many helpful resources for them.   Disposal of Old Medications (www.oliver.gov/everyday-takeback-day) - Visit the Drug Enforcement Administration  website to find a nearby location where you can  properly dispose of old medications you no longer need.   Disposal of Used [place] (www.safeneedledisposal.org) - Visit the Safe Needle Disposal Organization website to  find a nearby location where you can properly dispose of used needles from your injectable medications.  Revised 6/14/2024

## 2025-04-30 NOTE — PROGRESS NOTES
[facility] RHEUMATOLOGY  [address], [address], [place] [address]  Phone: [phone] ? Fax: [phone]  [facility]/Health-Services/Rheumatology    NEW PATIENT VISIT NOTE      DATE OF SERVICE: 04/30/2025         Subjective     REFERRING PRACTITIONER:  NESTOR Bass  [address] [place] [address]    PATIENT IDENTIFICATION:  Salina Salcedo  [address] [place] [id number]    YOB: 1965    MEDICAL RECORD NUMBER: [id number]         CHIEF COMPLAINT:   Chief Complaint   Patient presents with    New Patient     Rheumatoid arthritis involving multiple sites with positive rheumatoid factor       HISTORY OF PRESENT ILLNESS:  Salina Salcedo is a 60 y.o. 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    Patient established care with [facility] Rheumatology in 2023 for evaluation of rheumatoid arthritis.  She had strongly positive RF and anti-CCP in 2019 with erosive changes of the hands seen on hand x-rays in 2023.  Recalls having widespread joint pains at the time but most notable in the shoulders, fingers, and bottoms of the feet.  She was started on hydroxychloroquine in 12/2023 and continued on this on her last visit with previous rheumatologist Dr. Cabrera in 10/2024.  She is doing reasonably ok but have noticed that her neck has been painful and stiff which is more noticeable in the mornings.  Right knee seems to be more bothersome today with ambulation.  Right arm is painful when doing dishes and chores around the house.  She has pain on the balls of the feet with walking.  Cold weather is particularly aggravating for her joints.  Presently with no obvious joint swelling.  Notes few minutes of morning stiffness in the hands,  knees, and feet, improves with movement.  For the last couple of days have noticed some dry mouth which she attributes to lack of adequate water intake.  She has occasional dry eyes with gritty sensation, uses eyedrops every couple of weeks.  No history of RA related/pericardial effusions, interstitial lung disease, episodes of red painful photophobic eyes concerning for inflammatory eye disease, or cutaneous vasculitis.  No history of stroke, DVT/PE, demyelinating disease, lymphoma/leukemia, melanoma, other skin cancers, heart failure or diverticulitis.  Denies Raynaud's, mucosal ulcerations, psoriasis, pleuritic chest pains or dyspnea on exertion. She has a history of autoimmune hepatitis that was previously treated with budesonide and azathioprine, off therapy now with normal liver function tests.  Denies history of tobacco use.  Drinks occasionally, perhaps once a month.  No known family history of autoimmune rheumatologic diseases but maternal aunt may have had RA.    Reports other aspects of symptoms and medical history as noted on the questionnaire below or scanned under media tab.    Pertinent treatments:   Hydroxychloroquine 300 mg daily: 2023-present  Pertinent positive labs: 2024, vitamin D (<6); 2024 ([facility]), RF (>650), anti-CCP (>250), anti-smooth muscle (41); 2019, RF (550), anti-CCP >250, ESR (95), CRP (98 mg/L; 2007, AST (701), ALT (486)  Pertinent negative labs: 2024 ([facility]), antimitochondrial; 2023, KEMAR; 2019, lupus anticoagulant, KEMAR   Pertinent imagin/2023 bilateral hand XR: Osseous erosions involving the trapezius, index, long, ring finger metacarpal heads, thumb IP joint, and index finger PIP joint.  Mild radiocarpal, CMC joint, and MCP joint OA on the right.  Osseous erosion involving the scaphoid, trapezium, index, long MCP heads, and index/long PIP joins in addition to mild OA of the radiocarpal, triscaphe, CMC, and MCP joint on the left (swan-neck deformity  present).            REVIEW OF SYSTEMS:  Except as noted in the history above, relevant review of systems with emphasis on autoimmune rheumatic diseases was otherwise negative.      ACTIVE PROBLEM LIST:  Patient Active Problem List    Diagnosis Date Noted    Polyarthralgia 12/28/2023       PAST MEDICAL HISTORY:  No past medical history on file.    PAST SURGICAL HISTORY:  No past surgical history on file.    MEDICATIONS:  Current Outpatient Medications   Medication Sig    diclofenac DR (VOLTAREN) 50 MG Tablet Delayed Response Take 50 mg by mouth 2 times a day.    ergocalciferol (DRISDOL) 83754 UNIT capsule Take 1 Capsule by mouth every 7 days.    leflunomide (ARAVA) 20 MG Tab Take 1 Tablet by mouth every day.    hydroxychloroquine (PLAQUENIL) 200 MG Tab 11/2 tabs po qday    atorvastatin (LIPITOR) 10 MG Tab Take 10 mg by mouth every day.    levothyroxine (SYNTHROID) 88 MCG Tab TAKE 1 TABLET BY MOUTH EVERY DAY IN THE MORNING ON AN EMPTY STOMACH Oral Once a day for 90 days    omeprazole (PRILOSEC) 20 MG delayed-release capsule TAKE 1 CAPSULE BY MOUTH ONCE DAILY IN THE MORNING ON AN EMPTY STOMACH    budesonide (ENTOCORT EC) 3 MG Cap DR Particles capsule 3 capsules, 1 time per day Oral 1 DAY for 0 DYS (Patient not taking: Reported on 4/30/2025)    metformin (GLUCOPHAGE) 1000 MG tablet 1 tablet with a meal Oral twice a day for Diabetes for 90 days (Patient not taking: Reported on 4/30/2025)    cephALEXin (KEFLEX) 500 MG Cap Take 1 Cap by mouth 3 times a day. (Patient not taking: Reported on 4/30/2025)    phenazopyridine (PYRIDIUM) 200 MG Tab Take 1 Tab by mouth 3 times a day as needed for up to 9 doses. (Patient not taking: Reported on 4/2/2024)       ALLERGIES:   No Known Allergies    IMMUNIZATIONS:   Immunization History   Administered Date(s) Administered    Rhea SARS-CoV-2 Vaccine 05/04/2021       SOCIAL HISTORY:   Social History     Socioeconomic History    Marital status: Single   Tobacco Use    Smoking status:  "Never    Smokeless tobacco: Never   Vaping Use    Vaping status: Never Used   Substance and Sexual Activity    Alcohol use: Yes     Comment: occ    Drug use: Never       FAMILY HISTORY: See HPI         Objective     Vital Signs: /70   Pulse 89   Temp 36.4 °C (97.6 °F) (Temporal)   Resp 16   Ht 1.499 m (4' 11\")   Wt 66.7 kg (147 lb)   SpO2 93% Body mass index is 29.69 kg/m².    General: Appears well and comfortable  Eyes: No scleral or conjunctival lesions  ENT: No apparent oral or nasal lesions  Head/Neck: No apparent scalp or neck lesions  Cardiovascular: Regular rate and rhythm; no pericardial rubs  Respiratory: Breathing quiet and unlabored; no rales or pleural rubs  Gastrointestinal: No apparent organomegaly or abdominal masses  Integumentary: No significant cutaneous lesions such as nodulosis  Musculoskeletal:  Bilateral wrists with swelling on the dorsal aspect, no significant tenderness or restrictions in ROM  R hand: diffuse swelling, more prominent at 2/3 MCP joints, less at PIPs joints, mild tenderness 1st MCP joint, no significant restrictions in ROM  L hand: diffuse swelling at 2-5 MCP and 2-4 PIP joints, no significant tenderness or restrictions in ROM  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  No significant swelling, tenderness, warmth or limitations of motion at other joints examined  Neurologic: No focal sensory or motor deficits  Psychiatric: Mood and affect appropriate      LABORATORY RESULTS REVIEWED AND INTERPRETED BY ME:  Lab Results   Component Value Date/Time    TSHULTRASEN 0.727 04/22/2025 04:17 PM     Lab Results   Component Value Date/Time    25HYDROXY <6 (L) 04/22/2025 04:17 PM     Lab Results   Component Value Date/Time    WBC 6.9 04/22/2025 04:17 PM    RBC 4.06 (L) 04/22/2025 04:17 PM    HEMOGLOBIN 11.9 (L) 04/22/2025 04:17 PM    HEMATOCRIT 36.8 (L) 04/22/2025 04:17 PM    MCV 90.6 04/22/2025 04:17 PM    MCH 29.3 04/22/2025 04:17 PM    MCHC 32.3 04/22/2025 " 04:17 PM    RDW 46.2 04/22/2025 04:17 PM    PLATELETCT 304 04/22/2025 04:17 PM    MPV 10.4 04/22/2025 04:17 PM    NEUTS 4.38 04/22/2025 04:17 PM    LYMPHOCYTES 25.20 04/22/2025 04:17 PM    MONOCYTES 6.10 04/22/2025 04:17 PM    EOSINOPHILS 4.30 04/22/2025 04:17 PM    BASOPHILS 0.90 04/22/2025 04:17 PM     Lab Results   Component Value Date/Time    ASTSGOT 26 04/22/2025 04:17 PM    ALTSGPT 16 04/22/2025 04:17 PM    ALKPHOSPHAT 110 (H) 04/22/2025 04:17 PM    TBILIRUBIN 0.4 04/22/2025 04:17 PM    TOTPROTEIN 8.3 (H) 04/22/2025 04:17 PM    ALBUMIN 4.0 04/22/2025 04:17 PM     Lab Results   Component Value Date/Time    SODIUM 141 04/22/2025 04:17 PM    POTASSIUM 4.1 04/22/2025 04:17 PM    CHLORIDE 108 04/22/2025 04:17 PM    CO2 22 04/22/2025 04:17 PM    GLUCOSE 94 04/22/2025 04:17 PM    BUN 17 04/22/2025 04:17 PM    CREATININE 0.75 04/22/2025 04:17 PM    CREATININE 1.2 01/31/2007 12:30 PM    CALCIUM 9.0 04/22/2025 04:17 PM     Lab Results   Component Value Date/Time    COLORURINE Yellow 12/02/2016 09:15 PM    SPECGRAVITY 1.016 12/02/2016 09:15 PM    PHURINE 5.5 12/02/2016 09:15 PM    GLUCOSEUR Negative 12/02/2016 09:15 PM    KETONES Negative 12/02/2016 09:15 PM    PROTEINURIN 30 (A) 12/02/2016 09:15 PM     Lab Results   Component Value Date/Time    HEPBSAG Negative 02/02/2007 02:15 PM    HEPBCORIGM Negative 02/02/2007 02:15 PM    HEPCAB 0.3 02/02/2007 02:15 PM     Lab Results   Component Value Date/Time    CHOLSTRLTOT 160 04/22/2025 04:17 PM    LDL 90 04/22/2025 04:17 PM    HDL 44 04/22/2025 04:17 PM    TRIGLYCERIDE 130 04/22/2025 04:17 PM    HBA1C 6.6 (H) 04/22/2025 04:17 PM     RADIOLOGY RESULTS REVIEWED AND INTERPRETED BY ME: See above      All relevant laboratory and imaging results reported on this note were reviewed and interpreted by me.         Assessment & Plan     Salina Salcedo is a 60 y.o. female with history as noted above whose presentation merits the following clinical impressions and recommendations:    1.  Seropositive rheumatoid arthritis (HCC)   RF (>650), anti-CCP (>250)  Erosive disease.  Presently on hydroxychloroquine monotherapy with suboptimally controlled disease based on signs of active synovitis on exam.  Discussed optimizing therapy by adding leflunomide.  Prior to next visit, need to ascertain certain aspects of radiographic status for complete assessment.  - Start leflunomide (ARAVA) 20 MG Tab; Take 1 Tablet by mouth every day.  Dispense: 90 Tablet; Refill: 3  - Continue hydroxychloroquine 300 mg daily  - DX-HAND 2- RIGHT; Future  - DX-FOOT-2- RIGHT; Future  - DX-HAND 2- LEFT; Future  - DX-FOOT-2- LEFT; Future  - Comp Metabolic Panel; Future  - CBC WITH DIFFERENTIAL; Future    2. Immunosuppressed status (HCC)  Counseled on the potential adverse effects of leflunomide use, the need to avoid regular alcohol intake, and the need for routine monitoring labs.    3. Autoimmune hepatitis (HCC)  S/p budesonide and azathioprine.  Off therapy with normal hepatic function.  Recommend routine follow-up with GI.    4. Long-term use of Plaquenil  Patient is blind from retinitis pigmentosa so no utility in regular ophthalmologic evaluation for retinal toxicity.    5. Vitamin D deficiency  - ergocalciferol (DRISDOL) 18525 UNIT capsule; Take 1 Capsule by mouth every 7 days.  Dispense: 12 Capsule; Refill: 1    6. NSAID long-term use  Counseled on the potential adverse effects of long-term NSAID use, especially on the stomach, kidneys, and hematopoietic systems, and the need to take them with food and stay hydrated with enough water.    The above assessment and plan were discussed with the patient who acknowledged understanding of the plan.    FOLLOW-UP: Return in about 4 months (around 8/30/2025).    Note: More than 75 minutes were spent on this encounter, including extensive chart review for data acquisition and interpretation, educating and counseling of the patient about diagnosis, treatment, and prognosis, and  literature review for updated diagnostic and treatment guidelines.           Thank you for the opportunity to participate in the care of Salina Salcedo.    Gwen Morgan D.O.  Rheumatologist, [facility] Rhe[facility]ology, [facility]

## 2025-07-29 ENCOUNTER — APPOINTMENT (OUTPATIENT)
Dept: RADIOLOGY | Facility: MEDICAL CENTER | Age: 60
End: 2025-07-29
Attending: STUDENT IN AN ORGANIZED HEALTH CARE EDUCATION/TRAINING PROGRAM
Payer: MEDICAID

## 2025-07-29 ENCOUNTER — HOSPITAL ENCOUNTER (OUTPATIENT)
Facility: MEDICAL CENTER | Age: 60
End: 2025-07-29
Attending: STUDENT IN AN ORGANIZED HEALTH CARE EDUCATION/TRAINING PROGRAM
Payer: MEDICAID

## 2025-07-29 DIAGNOSIS — M05.9 SEROPOSITIVE RHEUMATOID ARTHRITIS (HCC): ICD-10-CM

## 2025-07-29 LAB
ALBUMIN SERPL BCP-MCNC: 4 G/DL (ref 3.2–4.9)
ALBUMIN/GLOB SERPL: 1 G/DL
ALP SERPL-CCNC: 138 U/L (ref 30–99)
ALT SERPL-CCNC: 24 U/L (ref 2–50)
ANION GAP SERPL CALC-SCNC: 14 MMOL/L (ref 7–16)
AST SERPL-CCNC: 32 U/L (ref 12–45)
BASOPHILS # BLD AUTO: 1.7 % (ref 0–1.8)
BASOPHILS # BLD: 0.1 K/UL (ref 0–0.12)
BILIRUB SERPL-MCNC: 0.4 MG/DL (ref 0.1–1.5)
BUN SERPL-MCNC: 10 MG/DL (ref 8–22)
CALCIUM ALBUM COR SERPL-MCNC: 9 MG/DL (ref 8.5–10.5)
CALCIUM SERPL-MCNC: 9 MG/DL (ref 8.5–10.5)
CHLORIDE SERPL-SCNC: 106 MMOL/L (ref 96–112)
CO2 SERPL-SCNC: 21 MMOL/L (ref 20–33)
CREAT SERPL-MCNC: 0.7 MG/DL (ref 0.5–1.4)
EOSINOPHIL # BLD AUTO: 0.44 K/UL (ref 0–0.51)
EOSINOPHIL NFR BLD: 7.6 % (ref 0–6.9)
ERYTHROCYTE [DISTWIDTH] IN BLOOD BY AUTOMATED COUNT: 49.3 FL (ref 35.9–50)
GFR SERPLBLD CREATININE-BSD FMLA CKD-EPI: 99 ML/MIN/1.73 M 2
GLOBULIN SER CALC-MCNC: 3.9 G/DL (ref 1.9–3.5)
GLUCOSE SERPL-MCNC: 100 MG/DL (ref 65–99)
HCT VFR BLD AUTO: 40.1 % (ref 37–47)
HGB BLD-MCNC: 12.9 G/DL (ref 12–16)
IMM GRANULOCYTES # BLD AUTO: 0.02 K/UL (ref 0–0.11)
IMM GRANULOCYTES NFR BLD AUTO: 0.3 % (ref 0–0.9)
LYMPHOCYTES # BLD AUTO: 1.57 K/UL (ref 1–4.8)
LYMPHOCYTES NFR BLD: 27.3 % (ref 22–41)
MCH RBC QN AUTO: 28.1 PG (ref 27–33)
MCHC RBC AUTO-ENTMCNC: 32.2 G/DL (ref 32.2–35.5)
MCV RBC AUTO: 87.4 FL (ref 81.4–97.8)
MONOCYTES # BLD AUTO: 0.76 K/UL (ref 0–0.85)
MONOCYTES NFR BLD AUTO: 13.2 % (ref 0–13.4)
NEUTROPHILS # BLD AUTO: 2.87 K/UL (ref 1.82–7.42)
NEUTROPHILS NFR BLD: 49.9 % (ref 44–72)
NRBC # BLD AUTO: 0 K/UL
NRBC BLD-RTO: 0 /100 WBC (ref 0–0.2)
PLATELET # BLD AUTO: 262 K/UL (ref 164–446)
PMV BLD AUTO: 11.3 FL (ref 9–12.9)
POTASSIUM SERPL-SCNC: 4.2 MMOL/L (ref 3.6–5.5)
PROT SERPL-MCNC: 7.9 G/DL (ref 6–8.2)
RBC # BLD AUTO: 4.59 M/UL (ref 4.2–5.4)
SODIUM SERPL-SCNC: 141 MMOL/L (ref 135–145)
WBC # BLD AUTO: 5.8 K/UL (ref 4.8–10.8)

## 2025-07-29 PROCEDURE — 36415 COLL VENOUS BLD VENIPUNCTURE: CPT

## 2025-07-29 PROCEDURE — 80053 COMPREHEN METABOLIC PANEL: CPT

## 2025-07-29 PROCEDURE — 85025 COMPLETE CBC W/AUTO DIFF WBC: CPT
